# Patient Record
Sex: FEMALE | Race: WHITE
[De-identification: names, ages, dates, MRNs, and addresses within clinical notes are randomized per-mention and may not be internally consistent; named-entity substitution may affect disease eponyms.]

---

## 2020-08-27 ENCOUNTER — HOSPITAL ENCOUNTER (OUTPATIENT)
Dept: HOSPITAL 92 - SCSULT | Age: 20
Discharge: HOME | End: 2020-08-27
Attending: INTERNAL MEDICINE
Payer: COMMERCIAL

## 2020-08-27 DIAGNOSIS — R19.7: ICD-10-CM

## 2020-08-27 DIAGNOSIS — R11.2: ICD-10-CM

## 2020-08-27 DIAGNOSIS — R10.33: Primary | ICD-10-CM

## 2020-08-27 PROCEDURE — 93975 VASCULAR STUDY: CPT

## 2025-07-28 NOTE — ULT
ULTRASOUND ABDOMEN COMPLETE:



DATE:

8/27/2020



HISTORY:

Periumbilical abdominal pain, nausea, and vomiting



FINDINGS:

Gallbladder: Normal wall thickness. No gallstones or sludge identified. No pericholecystic fluid.

Liver: Normal parenchymal echogenicity.

Bilateral kidneys: No hydronephrosis.

Pancreas: Nonspecific sonographic appearance.

Common duct caliber: 4 mm.

Abdominal aorta: No aneurysm

Inferior vena cava: Unremarkable where visualized.

Spleen: No splenomegaly



IMPRESSION:

Normal.



Reported By: Praveen Fortune 

Electronically Signed:  8/27/2020 8:11 AM 75